# Patient Record
Sex: MALE | Race: WHITE | Employment: UNEMPLOYED | URBAN - NONMETROPOLITAN AREA
[De-identification: names, ages, dates, MRNs, and addresses within clinical notes are randomized per-mention and may not be internally consistent; named-entity substitution may affect disease eponyms.]

---

## 2024-04-10 ENCOUNTER — OFFICE VISIT (OUTPATIENT)
Dept: URGENT CARE | Facility: CLINIC | Age: 19
End: 2024-04-10
Payer: COMMERCIAL

## 2024-04-10 VITALS
TEMPERATURE: 98 F | DIASTOLIC BLOOD PRESSURE: 66 MMHG | SYSTOLIC BLOOD PRESSURE: 97 MMHG | OXYGEN SATURATION: 100 % | HEART RATE: 52 BPM | HEIGHT: 77 IN | RESPIRATION RATE: 18 BRPM | WEIGHT: 163.6 LBS | BODY MASS INDEX: 19.32 KG/M2

## 2024-04-10 DIAGNOSIS — Z02.4 DRIVER'S PERMIT PHYSICAL EXAMINATION: Primary | ICD-10-CM

## 2024-04-10 NOTE — PROGRESS NOTES
[facility]        NAME: Brad Gomez is a 18 y.o. male  : 2005    MRN: [id number]  DATE: April 10, 2024  TIME: 6:55 PM    Assessment and Plan   's permit physical examination [Z02.4]  1. 's permit physical examination            Physical exam unremarkable.  Vitals normal.  Medical history without any limiting chronic medical conditions.  No history of substance abuse disorder.  Passed vision screen.  Qualified for 's permit 's: without restrictions.      Chief Complaint     Chief Complaint   Patient presents with    Annual Exam     [employer] permit          History of Present Illness       HPI        Patient presents today for 's physical exam.    Reports no chronic medications or over-the-counter medications.  Denies any chronic medical conditions including neurologic disorders, uncontrolled epilepsy, neuropsychiatric disorders, uncontrolled diabetes, circulatory disorder, cognitive impairment, cardiac disorder, hypertension, alcohol abuse, drug abuse.    Denies any chest pain, dyspnea, abdominal pain, nausea, vomiting, headache, visual changes, lightheadedness, weakness, numbness.    Review of Systems   Review of Systems   Constitutional:  Negative for chills and fever.   HENT:  Negative for ear pain and sore throat.    Eyes:  Negative for pain and visual disturbance.   Respiratory:  Negative for cough and shortness of breath.    Cardiovascular:  Negative for chest pain and palpitations.   Gastrointestinal:  Negative for abdominal pain and vomiting.   Genitourinary:  Negative for dysuria and hematuria.   Musculoskeletal:  Negative for arthralgias and back pain.   Skin:  Negative for color change and rash.   Neurological:  Negative for seizures and syncope.   All other systems reviewed and are negative.      Current Medications     No current outpatient medications on file.    Current Allergies     Allergies as of 04/10/2024    (No Known Allergies)            The  "following portions of the patient's history were reviewed and updated as appropriate: allergies, current medications, past family history, past medical history, past social history, past surgical history and problem list.     History reviewed. No pertinent past medical history.    History reviewed. No pertinent surgical history.    History reviewed. No pertinent family history.      Medications have been verified.        Objective   BP 97/66   Pulse (!) 52   Temp 98 °F (36.7 °C)   Resp 18   Ht 6' 5\" (1.956 m)   Wt 74.2 kg (163 lb 9.6 oz)   SpO2 100%   BMI 19.40 kg/m²        Physical Exam         Physical Exam  Constitutional:       General: Not in acute distress.     Appearance: Normal appearance.   HENT:      Head: Normocephalic and atraumatic.      Right Ear: External ear normal.      Left Ear: External ear normal.      Nose: Nose normal.      Mouth/Throat:      Mouth: Mucous membranes are moist.   Eyes:      Extraocular Movements: Extraocular movements intact.      Conjunctiva/sclera: Conjunctivae normal.      Horizontal vision range: >/=110 degrees bilaterally  Cardiovascular:      Rate and Rhythm: Normal rate and regular rhythm.   Pulmonary:      Effort: Pulmonary effort is normal. No respiratory distress.      Breath sounds: Normal breath sounds.   Musculoskeletal:      Cervical back: Normal and normal range of motion. No rigidity. Normal range of motion.      Thoracic back: Normal. Normal range of motion. No scoliosis.      Lumbar back: Normal. Normal range of motion. No scoliosis.   Lymphadenopathy:      Cervical: No cervical adenopathy.   Skin:     General: Skin is warm and dry.   Neurological:      General: No focal deficit present.      Mental Status: Alert and oriented to person, place, and time.   Psychiatric:         Mood and Affect: Mood normal.         Behavior: Behavior normal.     Vision Screening    Right eye Left eye Both eyes   Without correction 20/20 20/20 20/20   With correction    "